# Patient Record
Sex: FEMALE | Race: WHITE | Employment: PART TIME | URBAN - METROPOLITAN AREA
[De-identification: names, ages, dates, MRNs, and addresses within clinical notes are randomized per-mention and may not be internally consistent; named-entity substitution may affect disease eponyms.]

---

## 2021-10-27 ENCOUNTER — OFFICE VISIT (OUTPATIENT)
Dept: CARDIOLOGY CLINIC | Age: 21
End: 2021-10-27
Payer: COMMERCIAL

## 2021-10-27 VITALS
HEIGHT: 67 IN | SYSTOLIC BLOOD PRESSURE: 118 MMHG | BODY MASS INDEX: 21.19 KG/M2 | OXYGEN SATURATION: 100 % | DIASTOLIC BLOOD PRESSURE: 72 MMHG | WEIGHT: 135 LBS

## 2021-10-27 DIAGNOSIS — R55 SYNCOPE, UNSPECIFIED SYNCOPE TYPE: Primary | ICD-10-CM

## 2021-10-27 PROCEDURE — 93000 ELECTROCARDIOGRAM COMPLETE: CPT | Performed by: INTERNAL MEDICINE

## 2021-10-27 PROCEDURE — 99204 OFFICE O/P NEW MOD 45 MIN: CPT | Performed by: INTERNAL MEDICINE

## 2021-10-27 RX ORDER — NORGESTIMATE AND ETHINYL ESTRADIOL 0.25-0.035
KIT ORAL
COMMUNITY
Start: 2021-08-06

## 2021-10-27 NOTE — PROGRESS NOTES
Joellen Jurado    Chief Complaint   Patient presents with    New Patient     Ref by Dr. Mayra Lowery for Snycope       HPI    Joellen Jurado is a 24 y.o. extremely pleasant  female with no known heart disease furred by GI due to persistent symptoms of palpitations and near syncope. As you know this patient has been in her normal state of health really only takes control as a medication and has no chronic medical problems. The past several years very sporadically usually in a standing or sitting position she reports suddenly feeling like she needs to have a bowel movement sweaty and like she could pass out. She is likely never had true palpitations but thinks that she could be getting worked up was not sure if it was an anxiety attack as she does feel like her heart is racing during these episodes. If she kind of lays down and takes a deep breath usually she feels better but due to the symptoms she was sent to GI and it overall sounds like everything has checked out just fine from their end. Reviewed her family history which was negative for premature coronary disease she does mention a grandfather who had some heart problems but says he did not take good care of himself. History reviewed. No pertinent past medical history. History reviewed. No pertinent surgical history.     Current Outpatient Medications   Medication Sig Dispense Refill    Sprintec, 28, 0.25-35 mg-mcg tab          No Known Allergies    Social History     Socioeconomic History    Marital status: SINGLE     Spouse name: Not on file    Number of children: Not on file    Years of education: Not on file    Highest education level: Not on file   Occupational History    Not on file   Tobacco Use    Smoking status: Never Smoker    Smokeless tobacco: Never Used   Vaping Use    Vaping Use: Never used   Substance and Sexual Activity    Alcohol use: Yes     Comment: at times    Drug use: Never    Sexual activity: Yes   Other Topics Concern    Not on file   Social History Narrative    Not on file     Social Determinants of Health     Financial Resource Strain:     Difficulty of Paying Living Expenses:    Food Insecurity:     Worried About Running Out of Food in the Last Year:     [address] in the Last Year:    Transportation Needs:     Lack of Transportation (Medical):  Lack of Transportation (Non-Medical):    Physical Activity:     Days of Exercise per Week:     Minutes of Exercise per Session:    Stress:     Feeling of Stress :    Social Connections:     Frequency of Communication with Friends and Family:     Frequency of Social Gatherings with Friends and Family:     Attends Spiritism Services:     Active Member of Clubs or Organizations:     Attends Club or Organization Meetings:     Marital Status:    Intimate Partner Violence:     Fear of Current or Ex-Partner:     Emotionally Abused:     Physically Abused:     Sexually Abused:         FH: neg for premature coronary disease, no sudden cardiac death    Review of Systems    14 pt Review of Systems is negative unless otherwise mentioned in the HPI. Wt Readings from Last 3 Encounters:   10/27/21 61.2 kg (135 lb)     Temp Readings from Last 3 Encounters:   No data found for Temp     BP Readings from Last 3 Encounters:   10/27/21 118/72     Pulse Readings from Last 3 Encounters:   No data found for Pulse           Physical Exam:    Visit Vitals  /72 (BP 1 Location: Left upper arm, BP Patient Position: Sitting, BP Cuff Size: Small adult)   Ht 5' 7\" (1.702 m)   Wt 61.2 kg (135 lb)   SpO2 100%   BMI 21.14 kg/m²      Physical Exam  HENT:      Head: Normocephalic and atraumatic. Eyes:      Pupils: Pupils are equal, round, and reactive to light. Cardiovascular:      Rate and Rhythm: Normal rate and regular rhythm. Heart sounds: Normal heart sounds. No murmur heard. No friction rub. No gallop.     Pulmonary:      Effort: Pulmonary effort is normal. No respiratory distress. Breath sounds: Normal breath sounds. No wheezing or rales. Chest:      Chest wall: No tenderness. Abdominal:      General: Bowel sounds are normal.      Palpations: Abdomen is soft. Musculoskeletal:         General: No tenderness. Skin:     General: Skin is warm and dry. Neurological:      Mental Status: She is alert and oriented to person, place, and time. EKG today shows: NSR, normal axis and intervals, no ST segment abnormalities    Impression and Plan:  Amy Redman is a 24 y.o. with:    Near syncope  No chronic med conditions  No family hx for SCD etc  Normal EKG and CV exam    After significant time spent hearing about patient's somewhat kind of chronic sporadic symptoms clinically it sounds consistent with autonomic dysfunction/neurocardiogenic syncope really without even having true syncope I do not see appropriateness of further cardiovascular testing I think we can make this diagnosis very comfortably based on just her symptoms and otherwise normal exam and EKG alone. I spent significant amount of time with education she can try to abort her symptoms. I asked her to call me if worsens etc.    The most frequent mechanism for reflex syncope is a mixed hemodynamic response, although an individual patient may have syncopal events characterized principally by vasodepressor, cardioinhibitory, or mixed responses. The cardioinhibitory response results from increased parasympathetic activation and may be manifested by sinus bradycardia, NC interval prolongation, advanced atrioventricular block, and/or asystole. The vasodepressor response is due to decreased sympathetic activity and can lead to symptomatic hypotension even in the absence of severe bradycardia.      Education today included etiology and natural history of Neurocardiogenic/ Reflex syncope, and ways to avoid actually losing consciousness such as staying well hydrated and sitting/ or laying down when you feel \"premonition. \"    Thank you for allowing me to participate in the care of your patient, please do not hesitate to call with questions or concerns.         Pati Oppenheim, DO

## 2021-10-27 NOTE — PROGRESS NOTES
Maxwell Joshi presents today for   Chief Complaint   Patient presents with    New Patient     Ref by Dr. Booker Olson for [address] preferred language for health care discussion is english/other. Is someone accompanying this pt? no    Is the patient using any DME equipment during [address]? no    Depression Screening:  3 most recent PHQ Screens 10/27/2021   Little interest or pleasure in doing things Not at all   Feeling down, depressed, irritable, or hopeless Not at all   Total Score PHQ 2 0       Learning Assessment:  Learning Assessment 10/27/2021   PRIMARY LEARNER Patient   PRIMARY LANGUAGE ENGLISH   LEARNER PREFERENCE PRIMARY DEMONSTRATION   ANSWERED BY patient   RELATIONSHIP SELF       Abuse Screening:  Abuse Screening Questionnaire 10/27/2021   Do you ever feel afraid of your partner? N   Are you in a relationship with someone who physically or mentally threatens you? N   Is it safe for you to go home? Y             Pt currently taking Anticoagulant therapy? no    Pt currently taking Antiplatelet therapy ? no      Coordination of Care:  1. Have you been to the ER, urgent care clinic since your last visit? Hospitalized since your last visit? no    2. Have you seen or consulted any other health care providers outside of the [address] [place] [address] since your last visit? Include any pap smears or colon screening.  no

## 2024-07-12 ENCOUNTER — OFFICE VISIT (OUTPATIENT)
Age: 24
End: 2024-07-12
Payer: COMMERCIAL

## 2024-07-12 VITALS
WEIGHT: 155 LBS | OXYGEN SATURATION: 98 % | HEART RATE: 105 BPM | DIASTOLIC BLOOD PRESSURE: 72 MMHG | SYSTOLIC BLOOD PRESSURE: 110 MMHG | BODY MASS INDEX: 24.33 KG/M2 | HEIGHT: 67 IN

## 2024-07-12 DIAGNOSIS — G90.A POTS (POSTURAL ORTHOSTATIC TACHYCARDIA SYNDROME): Primary | ICD-10-CM

## 2024-07-12 PROCEDURE — 99203 OFFICE O/P NEW LOW 30 MIN: CPT | Performed by: INTERNAL MEDICINE

## 2024-07-12 NOTE — PROGRESS NOTES
[facility]    Miguel Patricia is 24 y.o. female     Patient is here today for cardiac evaluation  Denies prior history of MI or CHF  Complaining of some palpitation, fatigue occasional episode of dizziness on and off for last 5 years seems to be getting worse.  She feels like she has significant palpitation even with minimal change in position.  Sometimes even sitting or minimal change with position her heart rate has reported be up to 150 bpm by [employer] Watch.  She feels dizzy at times.  She had syncopal episode 2 years ago.  She denies any nausea vomiting diarrhea.  No weight loss or weight gain.  No significant resting exertional chest pain or chest tightness.  No significant dyspnea.  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  No edema  Denies any nausea, vomiting, abdominal pain, fever, chills, sputum production. No hematuria or other bleeding complaints    No past medical history on file.    Review of Systems:  Cardiac symptoms as noted above in HPI. All others negative.  Denies fatigue, malaise, skin rash, joint pain, blurring vision, photophobia, neck pain, hemoptysis, chronic cough, nausea, vomiting, hematuria, burning micturition, BRBPR, chronic headaches.    No current outpatient medications on file.     No current facility-administered medications for this visit.       Past Surgical History:   Procedure Laterality Date    LYMPHADENECTOMY  08/12/2020    DISSECTION, NECK, MODIFIED RADICAL;  Surgeon: Andrea Armstrong MD       Allergies and Sensitivities:  No Known Allergies    Family History:  Family History   Problem Relation Age of Onset    Diabetes Maternal Grandfather        Social History:  Social History     Tobacco Use    Smoking status: Never    Smokeless tobacco: Never   Substance Use Topics    Alcohol use: Not Currently    Drug use: Not Currently     She  reports that she has never smoked. She has never used smokeless

## 2024-07-12 NOTE — PATIENT INSTRUCTIONS
You are scheduled to have a tilt table testing  on    - at - .   Please check in at -.     Please go to [facility] and park in the outpatient parking lot that is located around to the back of the hospital and enter through the [facility] building.  See map for directions.  [facility] in with the  there. If for some reason, there is not a  available, please use the phone that is there. The  will either give you directions or assist you in getting to the cath holding area.          3.   [x]          You are not to eat or drink anything after midnight the morning of the  procedure.      If you are diabetic, do not take your insulin/sugar pill the morning of the procedure.    We encourage families to wait in the waiting room on the first floor while the procedure is being done.  The Doctor will come out and talk with you as soon as the procedure is over.    You will need someone to drive you home, so please have someone available.    If you or your family have any questions, please call our office Monday- Friday,       9:00 a.m. - 4:30 p.m., at [phone], and ask to speak to one of the nurses.        If you have not heard from the central scheduler to schedule your testing in 48 hours, please call [phone].

## 2024-07-15 ENCOUNTER — TRANSCRIBE ORDERS (OUTPATIENT)
Facility: HOSPITAL | Age: 24
End: 2024-07-15

## 2024-07-15 ENCOUNTER — HOSPITAL ENCOUNTER (OUTPATIENT)
Facility: HOSPITAL | Age: 24
Discharge: HOME | End: 2024-07-18

## 2024-07-15 DIAGNOSIS — G90.A POSTURAL ORTHOSTATIC TACHYCARDIA SYNDROME: Primary | ICD-10-CM

## 2024-07-15 LAB — LABCORP SPECIMEN COLLECTION: NORMAL

## 2024-07-15 PROCEDURE — 99001 SPECIMEN HANDLING PT-LAB: CPT

## 2024-07-16 LAB
T3FREE SERPL-MCNC: 3 PG/ML (ref 2–4.4)
TSH SERPL DL<=0.005 MIU/L-ACNC: 0.81 UIU/ML (ref 0.45–4.5)

## 2024-07-23 ENCOUNTER — TELEPHONE (OUTPATIENT)
Age: 24
End: 2024-07-23

## 2024-07-23 NOTE — TELEPHONE ENCOUNTER
Per request to schedule Tilt Table procedure. Called and left message for patient to call back to schedule @ [phone].

## 2024-09-12 ENCOUNTER — HOSPITAL ENCOUNTER (OUTPATIENT)
Facility: HOSPITAL | Age: 24
Discharge: HOME | End: 2024-09-12
Attending: INTERNAL MEDICINE | Admitting: RADIOLOGY
Payer: COMMERCIAL

## 2024-09-12 VITALS — DIASTOLIC BLOOD PRESSURE: 66 MMHG | OXYGEN SATURATION: 99 % | SYSTOLIC BLOOD PRESSURE: 105 MMHG | HEART RATE: 66 BPM

## 2024-09-12 DIAGNOSIS — G90.A POTS (POSTURAL ORTHOSTATIC TACHYCARDIA SYNDROME): ICD-10-CM

## 2024-09-12 LAB
TILT CV INITIAL SUPINE HEART RATE: 63 BPM
TILT CV INITIAL SUPINE MAX BP: NORMAL BPM
TILT CV INITIAL SUPINE RHYTHM: NORMAL
TILT CV INITIAL TILT BLOOD PRESSURE: NORMAL MMHG
TILT CV INITIAL TILT HEART RATE: 70 BPM
TILT CV INITIAL TILT RHYTHM: NORMAL
TILT CV MAX BP BLOOD PRESSURE: NORMAL MMHG
TILT CV MAX BP HEART RATE: 86 BPM
TILT CV MAX BP MINUTES: 10
TILT CV MAX BP RHYTHM: NORMAL
TILT CV MAX HEART RATE: 134 BPM
TILT CV MAX HR BLOOD PRESSURE: NORMAL MMHG
TILT CV MAX HR MINUTES: 23
TILT CV MAX HR RHYTHM: NORMAL
TILT CV MINIMUM BP BLOOD PRESSURE: NORMAL MMHG
TILT CV MINIMUM BP HEART RATE: 128 BPM
TILT CV MINIMUM BP MINUTES: 24
TILT CV MINIMUM BP RHYTHM: NORMAL
TILT CV MINIMUM HR BP: NORMAL MMHG
TILT CV MINIMUM HR HEART RATE: 63 BPM
TILT CV MINIMUM HR MINUTES: 0
TILT CV MINIMUM HR RHYTHM: NORMAL

## 2024-09-12 PROCEDURE — 93660 TILT TABLE EVALUATION: CPT | Performed by: INTERNAL MEDICINE

## 2024-09-12 PROCEDURE — 2580000003 HC RX 258: Performed by: INTERNAL MEDICINE

## 2024-09-12 PROCEDURE — 93660 TILT TABLE EVALUATION: CPT

## 2024-09-12 RX ORDER — 0.9 % SODIUM CHLORIDE 0.9 %
500 INTRAVENOUS SOLUTION INTRAVENOUS ONCE
Status: COMPLETED | OUTPATIENT
Start: 2024-09-12 | End: 2024-09-12

## 2024-09-12 RX ADMIN — SODIUM CHLORIDE 500 ML: 9 INJECTION, SOLUTION INTRAVENOUS at 07:40

## 2024-09-16 ENCOUNTER — TELEPHONE (OUTPATIENT)
Age: 24
End: 2024-09-16

## 2024-09-26 ENCOUNTER — OFFICE VISIT (OUTPATIENT)
Age: 24
End: 2024-09-26
Payer: COMMERCIAL

## 2024-09-26 VITALS
WEIGHT: 152 LBS | BODY MASS INDEX: 23.81 KG/M2 | OXYGEN SATURATION: 98 % | SYSTOLIC BLOOD PRESSURE: 138 MMHG | DIASTOLIC BLOOD PRESSURE: 70 MMHG | HEART RATE: 74 BPM

## 2024-09-26 DIAGNOSIS — I47.19 ATRIAL TACHYCARDIA (HCC): Primary | ICD-10-CM

## 2024-09-26 DIAGNOSIS — R94.09 ABNORMAL TILT TABLE TEST: ICD-10-CM

## 2024-09-26 PROCEDURE — 99214 OFFICE O/P EST MOD 30 MIN: CPT | Performed by: PHYSICIAN ASSISTANT

## 2024-09-26 RX ORDER — METOPROLOL SUCCINATE 25 MG/1
25 TABLET, EXTENDED RELEASE ORAL DAILY
Qty: 30 TABLET | Refills: 3 | Status: SHIPPED | OUTPATIENT
Start: 2024-09-26

## 2024-10-22 RX ORDER — METOPROLOL SUCCINATE 25 MG/1
25 TABLET, EXTENDED RELEASE ORAL DAILY
Qty: 90 TABLET | Refills: 3 | Status: SHIPPED | OUTPATIENT
Start: 2024-10-22

## 2024-10-29 ENCOUNTER — TELEPHONE (OUTPATIENT)
Age: 24
End: 2024-10-29

## 2024-10-29 NOTE — TELEPHONE ENCOUNTER
Félix Reyes MD  You12 hours ago (8:04 PM)       Reduce dose to toprol xl 12.5 mg daily       Spoke with pt and let him know that the Toprol will be cut to 12.5mg daily.